# Patient Record
Sex: FEMALE | Race: WHITE
[De-identification: names, ages, dates, MRNs, and addresses within clinical notes are randomized per-mention and may not be internally consistent; named-entity substitution may affect disease eponyms.]

---

## 2019-03-12 ENCOUNTER — HOSPITAL ENCOUNTER (OUTPATIENT)
Dept: HOSPITAL 5 - MAMMO | Age: 74
Discharge: HOME | End: 2019-03-12
Attending: INTERNAL MEDICINE
Payer: MEDICARE

## 2019-03-12 DIAGNOSIS — Z12.31: Primary | ICD-10-CM

## 2019-03-12 PROCEDURE — 77067 SCR MAMMO BI INCL CAD: CPT

## 2019-03-12 NOTE — MAMMOGRAPHY REPORT
BILATERAL DIGITAL SCREENING MAMMOGRAM with CAD: 03/12/19



CLINICAL: Routine screening.



COMPARISON:None available.  However, a prior mammogram was apparently 

done at [facility].



FINDINGS: The breasts are heterogeneously dense, which may obscure 

small masses.  Right asymmetries on the CC view requires comparison 

with a prior mammogram or additional imaging.No architectural 

distortion or suspicious calcifications.The left breast is negative.



IMPRESSION: Right asymmetries requiring further evaluation.



BI-RADS CATEGORY:  0 -- Additional Evaluation Required



RECOMMENDATION: Comparison with a previous mammogram.



We will attempt to obtain a prior mammogram for comparison.  If we do 

not obtain a prior mammogram  within 30 days, a revised report will be 

issued recommending a recall for additional imaging. Please be advised 

that the patient should not schedule an appointment for return until 

adequate time (at least 2 weeks) has passed for us to obtain the prior 

mammogram.



ACR BI-RADS MAMMOGRAPHIC CODES:

0 = Needs additional imaging evaluation; 1 = Negative; 2 = Benign; 3 = 

Probably benign; 4 = Suspicious; 5 = Malignant; 6 = Known biopsy-proven 

malignancy



COMMENT:

      1.   Dense breast tissue, i.e., adenosis, fibrocystic 

            changes, etc., may obscure an underlying neoplasm.

      2.   Approximately 10% of cancers are not detected with

            mammography.

      3.   A negative mammography report should not delay biopsy 

            if a clinically suspicious mass is present.



COMMENT:

Patient follow-up letters are generated via our [email or web address] application.